# Patient Record
Sex: FEMALE | Race: WHITE | Employment: PART TIME | URBAN - METROPOLITAN AREA
[De-identification: names, ages, dates, MRNs, and addresses within clinical notes are randomized per-mention and may not be internally consistent; named-entity substitution may affect disease eponyms.]

---

## 2023-01-14 ENCOUNTER — HOSPITAL ENCOUNTER (EMERGENCY)
Age: 23
Discharge: HOME | End: 2023-01-14
Attending: EMERGENCY MEDICINE
Payer: COMMERCIAL

## 2023-01-14 VITALS
DIASTOLIC BLOOD PRESSURE: 71 MMHG | TEMPERATURE: 98 F | SYSTOLIC BLOOD PRESSURE: 114 MMHG | OXYGEN SATURATION: 100 % | HEART RATE: 84 BPM | RESPIRATION RATE: 16 BRPM

## 2023-01-14 DIAGNOSIS — M54.9 BACK PAIN WITHOUT RADIATION: Primary | ICD-10-CM

## 2023-01-14 PROCEDURE — 99283 EMERGENCY DEPT VISIT LOW MDM: CPT

## 2023-01-14 RX ORDER — LORATADINE 10 MG/1
10 TABLET ORAL DAILY
COMMUNITY

## 2023-01-14 RX ORDER — CYCLOBENZAPRINE HCL 10 MG
10 TABLET ORAL 3 TIMES DAILY PRN
Qty: 20 TABLET | Refills: 0 | Status: SHIPPED | OUTPATIENT
Start: 2023-01-14 | End: 2023-01-21

## 2023-01-14 NOTE — DISCHARGE INSTRUCTIONS
Apply ice 20 minutes 4 times a day Tylenol or Advil for milder pain take the Flexeril for more severe pain this may make you drowsy do not drink alcohol or drive while taking this.   Follow-up with your doctor in a few days consider physical therapy if no improvement

## (undated) NOTE — LETTER
January 14, 2023    Patient: Kylah Harrell   Date of Visit: 1/14/2023       To Whom It May Concern:    Kylah Harrell was seen and treated in our emergency department on 1/14/2023. She should not return to work until 1 week May return sooner if better. If you have any questions or concerns, please don't hesitate to call.        Encounter Provider(s):    Andrew Templeton MD